# Patient Record
Sex: MALE | Race: BLACK OR AFRICAN AMERICAN | Employment: FULL TIME | URBAN - METROPOLITAN AREA
[De-identification: names, ages, dates, MRNs, and addresses within clinical notes are randomized per-mention and may not be internally consistent; named-entity substitution may affect disease eponyms.]

---

## 2024-06-23 ENCOUNTER — HOSPITAL ENCOUNTER (EMERGENCY)
Age: 24
Discharge: HOME | End: 2024-06-23

## 2024-06-23 ENCOUNTER — APPOINTMENT (OUTPATIENT)
Dept: GENERAL RADIOLOGY | Age: 24
End: 2024-06-23

## 2024-06-23 VITALS
DIASTOLIC BLOOD PRESSURE: 90 MMHG | HEART RATE: 124 BPM | OXYGEN SATURATION: 97 % | BODY MASS INDEX: 19.7 KG/M2 | SYSTOLIC BLOOD PRESSURE: 145 MMHG | TEMPERATURE: 98.5 F | WEIGHT: 130 LBS | HEIGHT: 68 IN | RESPIRATION RATE: 18 BRPM

## 2024-06-23 DIAGNOSIS — R05.9 COUGH, UNSPECIFIED TYPE: Primary | ICD-10-CM

## 2024-06-23 PROCEDURE — 71045 X-RAY EXAM CHEST 1 VIEW: CPT

## 2024-06-23 PROCEDURE — 99283 EMERGENCY DEPT VISIT LOW MDM: CPT

## 2024-06-23 ASSESSMENT — LIFESTYLE VARIABLES
HOW OFTEN DO YOU HAVE A DRINK CONTAINING ALCOHOL: NEVER
HOW MANY STANDARD DRINKS CONTAINING ALCOHOL DO YOU HAVE ON A TYPICAL DAY: PATIENT DOES NOT DRINK

## 2024-06-23 ASSESSMENT — PAIN - FUNCTIONAL ASSESSMENT: PAIN_FUNCTIONAL_ASSESSMENT: NONE - DENIES PAIN

## 2024-06-23 NOTE — ED NOTES
Patient mobility status  with no difficulty. Provider aware     I have reviewed discharge instructions with the patient.  The patient verbalized understanding.    Patient left ED via Discharge Method: ambulatory to Home with self.    Opportunity for questions and clarification provided.     Patient given 0 scripts.

## 2024-06-23 NOTE — DISCHARGE INSTRUCTIONS
Stop smoking, use over-the-counter decongestants if needed call office above to arrange routine follow-up appointment return to ER for any worsening symptom

## 2024-06-23 NOTE — ED TRIAGE NOTES
Congestion in chest with white sputum. Pt states he has been smoking Marijuana and wonders if it is due to that.

## 2024-06-23 NOTE — ED PROVIDER NOTES
Emergency Department Provider Note       PCP: No primary care provider on file.   Age: 23 y.o.   Sex: male     DISPOSITION Decision To Discharge 06/23/2024 01:42:05 PM       ICD-10-CM    1. Cough, unspecified type  R05.9           Medical Decision Making     23-year-old male complains of mild upper airway congestion off and on for the past month since he is smokes marijuana daily and feels like this could have something to do with it.  He denies any fever or chest pain.  Chest x-ray today is unremarkable.  Exam is benign.  Advised patient to stop smoking.  Outlets given for primary care work note given May use over-the-counter decongestant as needed     1 acute, uncomplicated illness or injury.  Shared medical decision making was utilized in creating the patients health plan today.    I independently ordered and reviewed each unique test.       I interpreted the X-rays single view chest x-ray unremarkable.              History     Pt c/o off and on chest congestion and white sputum, every few days,  feeling like he has to clear his throat smokes marijuana daily, denies any fever chest pain          ROS     Review of Systems   All other systems reviewed and are negative.       Physical Exam     Vitals signs and nursing note reviewed:  Vitals:    06/23/24 1224   BP: (!) 145/90   Pulse: (!) 124   Resp: 18   Temp: 98.5 °F (36.9 °C)   TempSrc: Oral   SpO2: 97%   Weight: 59 kg (130 lb)   Height: 1.727 m (5' 8\")      Physical Exam  Vitals reviewed.   Constitutional:       Appearance: Normal appearance. He is normal weight.   HENT:      Head: Normocephalic and atraumatic.      Right Ear: External ear normal.      Left Ear: External ear normal.      Nose: Nose normal.      Mouth/Throat:      Mouth: Mucous membranes are moist.      Pharynx: Oropharynx is clear.   Eyes:      Extraocular Movements: Extraocular movements intact.      Conjunctiva/sclera: Conjunctivae normal.      Pupils: Pupils are equal, round, and reactive